# Patient Record
Sex: MALE | Race: WHITE | NOT HISPANIC OR LATINO | Employment: UNEMPLOYED | URBAN - METROPOLITAN AREA
[De-identification: names, ages, dates, MRNs, and addresses within clinical notes are randomized per-mention and may not be internally consistent; named-entity substitution may affect disease eponyms.]

---

## 2022-10-05 PROBLEM — T14.8XXA MUSCLE STRAIN: Status: ACTIVE | Noted: 2022-10-05

## 2022-10-05 PROBLEM — I21.4 NSTEMI (NON-ST ELEVATED MYOCARDIAL INFARCTION): Status: ACTIVE | Noted: 2022-10-05

## 2022-10-05 PROBLEM — R45.89 DEPRESSED MOOD: Status: ACTIVE | Noted: 2022-10-05

## 2022-10-05 PROBLEM — Z72.0 TOBACCO ABUSE: Status: ACTIVE | Noted: 2022-10-05

## 2022-12-05 ENCOUNTER — CLINICAL SUPPORT (OUTPATIENT)
Dept: SMOKING CESSATION | Facility: CLINIC | Age: 47
End: 2022-12-05
Payer: COMMERCIAL

## 2022-12-05 DIAGNOSIS — F17.200 NICOTINE DEPENDENCE: Primary | ICD-10-CM

## 2022-12-05 PROCEDURE — 99404 PR PREVENT COUNSEL,INDIV,60 MIN: ICD-10-PCS | Mod: S$GLB,,, | Performed by: GENERAL PRACTICE

## 2022-12-05 PROCEDURE — 99404 PREV MED CNSL INDIV APPRX 60: CPT | Mod: S$GLB,,, | Performed by: GENERAL PRACTICE

## 2022-12-05 NOTE — Clinical Note
Met with patient in clinic to conduct intake appointment. Patient will be participating in weekly tobacco cessation meetings and will begin the prescribed tobacco cessation medication Chantix (Varenicline) 1mg twice a day. FTND of 4 indicates a low level of dependence to tobacco. DARYL-D of 11 is perceived as no mental distress/ depression.

## 2022-12-05 NOTE — PROGRESS NOTES
Met with patient in clinic to conduct intake appointment. Patient will be participating in weekly tobacco cessation meetings and will begin the prescribed tobacco cessation medication Chantix (Varenicline) 1mg twice a day. FTND of 4 indicates a low level of dependence to tobacco. DARYL-D of 11 is perceived as no mental distress/ depression.     Created addendum to order Chantix medication

## 2022-12-07 RX ORDER — VARENICLINE TARTRATE 1 MG/1
1 TABLET, FILM COATED ORAL 2 TIMES DAILY
Qty: 56 TABLET | Refills: 0 | Status: SHIPPED | OUTPATIENT
Start: 2022-12-07 | End: 2023-01-04

## 2022-12-14 ENCOUNTER — CLINICAL SUPPORT (OUTPATIENT)
Dept: SMOKING CESSATION | Facility: CLINIC | Age: 47
End: 2022-12-14
Payer: MEDICAID

## 2022-12-14 DIAGNOSIS — F17.200 NICOTINE DEPENDENCE: Primary | ICD-10-CM

## 2022-12-14 PROCEDURE — 99402 PR PREVENT COUNSEL,INDIV,30 MIN: ICD-10-PCS | Mod: S$GLB,,, | Performed by: GENERAL PRACTICE

## 2022-12-14 PROCEDURE — 99402 PREV MED CNSL INDIV APPRX 30: CPT | Mod: S$GLB,,, | Performed by: GENERAL PRACTICE

## 2022-12-14 NOTE — Clinical Note
Spoke with patient at length via phone regarding tobacco cessation progress. The patient remains on the prescribed tobacco cessation medication regimen of Chantix 1mg twice daily without any negative side effects at this time. Patient is on day 5 of medication. He says that he has decreased from 20 to 12 cigarettes a day. I commended him on cutting down. He stated that he feels a lot better. Patient wanted to quit smoking because he had a heart attack 3 months ago and he currently has high blood pressure. Triggers are boredom, after meal and Dr. Pepper. Goal next 2 weeks is to delay after meal, decrease caffeine intake and add water. Discussed learned addiction model, cues/triggers, personal reasons for quitting, medications, and goals. The patient will continue with therapy sessions and medication monitoring by CTTS. Prescribed medication management will be by physician. The patient denies any abnormal behavioral or mental changes at this time.

## 2022-12-14 NOTE — PROGRESS NOTES
Individual Follow-Up Form    12/14/2022    Quit Date: none    Clinical Status of Patient: Outpatient    Length of Service: 30 minutes    Continuing Medication: yes  Chantix    Other Medications: none     Target Symptoms: Withdrawal and medication side effects. The following were rated moderate (3) to severe (4) on TCRS:  Moderate (3): none  Severe (4): none    Comments: Spoke with patient at length via phone regarding tobacco cessation progress. The patient remains on the prescribed tobacco cessation medication regimen of Chantix 1mg twice daily without any negative side effects at this time. Patient is on day 5 of medication. He says that he has decreased from 20 to 12 cigarettes a day. I commended him on cutting down. He stated that he feels a lot better. Patient wanted to quit smoking because he had a heart attack 3 months ago and he currently has high blood pressure. Triggers are boredom, after meal and Dr. Pepper. Goal next 2 weeks is to delay after meal, decrease caffeine intake and add water. Discussed learned addiction model, cues/triggers, personal reasons for quitting, medications, and goals. The patient will continue with therapy sessions and medication monitoring by CTTS. Prescribed medication management will be by physician. The patient denies any abnormal behavioral or mental changes at this time.           Diagnosis: F17.200    Next Visit: 2 weeks

## 2023-01-04 ENCOUNTER — CLINICAL SUPPORT (OUTPATIENT)
Dept: SMOKING CESSATION | Facility: CLINIC | Age: 48
End: 2023-01-04
Payer: COMMERCIAL

## 2023-01-04 DIAGNOSIS — F17.200 NICOTINE DEPENDENCE: Primary | ICD-10-CM

## 2023-01-04 PROCEDURE — 99402 PR PREVENT COUNSEL,INDIV,30 MIN: ICD-10-PCS | Mod: S$GLB,,, | Performed by: GENERAL PRACTICE

## 2023-01-04 PROCEDURE — 99402 PREV MED CNSL INDIV APPRX 30: CPT | Mod: S$GLB,,, | Performed by: GENERAL PRACTICE

## 2023-01-04 RX ORDER — VARENICLINE TARTRATE 1 MG/1
1 TABLET, FILM COATED ORAL 2 TIMES DAILY
Qty: 56 TABLET | Refills: 0 | Status: SHIPPED | OUTPATIENT
Start: 2023-01-04 | End: 2023-01-09

## 2023-01-04 NOTE — PROGRESS NOTES
Individual Follow-Up Form    1/4/2023    Quit Date: TBD    Clinical Status of Patient: Outpatient    Length of Service: 30 minutes    Continuing Medication: yes  Chantix    Other Medications: none     Target Symptoms: Withdrawal and medication side effects. The following were  rated moderate (3) to severe (4) on TCRS:  Moderate (3): none  Severe (4): none    Comments: Spoke with patient at length via phone regarding tobacco cessation progress. The patient remains on the prescribed tobacco cessation medication regimen of Chantix 1mg twice daily without any negative side effects at this time. Refilled Chantix. He has decreased from 12 to 5 cigarettes a day. I commended him on cutting down. Patient met goal of making home tobacco free and switched from Dr. Pepper to Gloria. Trigger remain after meal. Goal next two weeks is to wait at least 10 minutes after meal before smoking. Patient stated no withdrawal symptoms. Reviewed strategies, cues, triggers and new goal set. The patient will continue with  therapy sessions and medication monitoring by CTTS. Prescribed medication management will be by physician. The patient denies any abnormal behavioral or mental changes at this time.         Diagnosis: F17.200    Next Visit: 2 weeks

## 2023-01-04 NOTE — Clinical Note
Spoke with patient at length via phone regarding tobacco cessation progress. The patient remains on the prescribed tobacco cessation medication regimen of Chantix 1mg twice daily without any negative side effects at this time. Refilled Chantix. He has decreased from 12 to 5 cigarettes a day. I commended him on cutting down. Patient met goal of making home tobacco free and switched from Dr. Pepper to Gloria. Trigger remain after meal. Goal next two weeks is to wait at least 10 minutes after meal before smoking. Patient stated no withdrawal symptoms. Reviewed strategies, cues, triggers and new goal set. The patient will continue with  therapy sessions and medication monitoring by CTTS. Prescribed medication management will be by physician. The patient denies any abnormal behavioral or mental changes at this time.

## 2023-01-09 DIAGNOSIS — F17.200 NICOTINE DEPENDENCE: Primary | ICD-10-CM

## 2023-01-09 PROBLEM — I21.4 NSTEMI (NON-ST ELEVATED MYOCARDIAL INFARCTION): Status: RESOLVED | Noted: 2022-10-05 | Resolved: 2023-01-09

## 2023-01-09 RX ORDER — VARENICLINE TARTRATE 1 MG/1
1 TABLET, FILM COATED ORAL 2 TIMES DAILY
Qty: 56 TABLET | Refills: 0 | Status: SHIPPED | OUTPATIENT
Start: 2023-01-09 | End: 2023-02-06 | Stop reason: SDUPTHER

## 2023-01-09 NOTE — TELEPHONE ENCOUNTER
Patient called and stated that the [facility] did not have the Chantix medication available. Sending refill request to [facility].

## 2023-01-18 ENCOUNTER — CLINICAL SUPPORT (OUTPATIENT)
Dept: SMOKING CESSATION | Facility: CLINIC | Age: 48
End: 2023-01-18
Payer: COMMERCIAL

## 2023-01-18 DIAGNOSIS — F17.200 NICOTINE DEPENDENCE: Primary | ICD-10-CM

## 2023-01-18 PROCEDURE — 99404 PREV MED CNSL INDIV APPRX 60: CPT | Mod: S$GLB,,, | Performed by: GENERAL PRACTICE

## 2023-01-18 PROCEDURE — 99404 PR PREVENT COUNSEL,INDIV,60 MIN: ICD-10-PCS | Mod: S$GLB,,, | Performed by: GENERAL PRACTICE

## 2023-01-18 RX ORDER — BUPROPION HYDROCHLORIDE 150 MG/1
TABLET, EXTENDED RELEASE ORAL
Qty: 60 TABLET | Refills: 0 | Status: SHIPPED | OUTPATIENT
Start: 2023-01-18 | End: 2023-02-22

## 2023-01-18 NOTE — Clinical Note
Patient remains on the prescribed tobacco cessation medication regimen of Chantix 1mg twice daily without any negative side effects at this time. No refill on Chantix. Ordered Wellbutrin SR 150mg. He has increased from 5 to 20 cigarettes a day due to increased anxiety and stress. Patient did not meet goal of making home completely tobacco free. He goes outside when its convenient. He smoke mainly in his bedroom. Goal is to make home tobacco free and keep cigarettes in truck and find hobby to keep hands busy. Reviewed strategies, controlling environment, cues, triggers, new goals set. Introduced high risk situations with preparation interventions, caffeine similarities with withdrawal issues of habit and nicotine, alcohol, understanding urges, cravings, stress and relaxation. The patient will continue with  therapy sessions and medication monitoring by CTTS. Prescribed medication management will be by physician. The patient denies any abnormal behavioral or mental changes at this time.

## 2023-01-18 NOTE — PROGRESS NOTES
Individual Follow-Up Form    1/18/2023    Quit Date: TBD    Clinical Status of Patient: Outpatient    Length of Service: 60 minutes    Continuing Medication: yes  Chantix    Other Medications: none     Target Symptoms: Withdrawal and medication side effects. The following were rated moderate (3) to severe (4) on TCRS:  Moderate (3): none  Severe (4): none    Comments: Met with patient in clinic regarding tobacco cessation progress.  Patient remains on the prescribed tobacco cessation medication regimen of Chantix 1mg twice daily without any negative side effects at this time. No refill on Chantix. Ordered Wellbutrin SR 150mg. He has increased from 5 to 20 cigarettes a day due to increased anxiety and stress. Patient did not meet goal of making home completely tobacco free. He goes outside when its convenient. He smoke mainly in his bedroom. He continues to drink 3 cans of Pepper per day. Goal is to make home tobacco free and keep cigarettes in truck and find hobby to keep hands busy. Reviewed strategies, controlling environment, cues, triggers, new goals set. Introduced high risk situations with preparation interventions, caffeine similarities with withdrawal issues of habit and nicotine, alcohol, understanding urges, cravings, stress and relaxation. The patient will continue with  therapy sessions and medication monitoring by CTTS. Prescribed medication management will be by physician. The patient denies any abnormal behavioral or mental changes at this time.           Diagnosis: F17.200    Next Visit: 2 weeks

## 2023-02-05 PROBLEM — Z72.0 TOBACCO ABUSE: Status: RESOLVED | Noted: 2022-10-05 | Resolved: 2023-02-05

## 2023-02-06 ENCOUNTER — CLINICAL SUPPORT (OUTPATIENT)
Dept: SMOKING CESSATION | Facility: CLINIC | Age: 48
End: 2023-02-06
Payer: COMMERCIAL

## 2023-02-06 DIAGNOSIS — F17.200 NICOTINE DEPENDENCE: ICD-10-CM

## 2023-02-06 DIAGNOSIS — F17.200 NICOTINE DEPENDENCE: Primary | ICD-10-CM

## 2023-02-06 PROCEDURE — 99404 PR PREVENT COUNSEL,INDIV,60 MIN: ICD-10-PCS | Mod: S$GLB,,, | Performed by: GENERAL PRACTICE

## 2023-02-06 PROCEDURE — 99404 PREV MED CNSL INDIV APPRX 60: CPT | Mod: S$GLB,,, | Performed by: GENERAL PRACTICE

## 2023-02-06 RX ORDER — VARENICLINE TARTRATE 1 MG/1
1 TABLET, FILM COATED ORAL 2 TIMES DAILY
Qty: 56 TABLET | Refills: 0 | Status: SHIPPED | OUTPATIENT
Start: 2023-02-06 | End: 2023-05-15 | Stop reason: SDUPTHER

## 2023-02-06 NOTE — Clinical Note
Met with patient in clinic regarding tobacco cessation progress.  Patient remains on the prescribed tobacco cessation medication regimen of Chantix 1mg twice daily without any negative side effects at this time. Refilled Chantix. He has decreased down to 5 cigarettes a day. I commended him on cutting down again. Patient meet goal of making home tobacco free. He continues to drink 3 cans of Dr.Pepper per day. Goal  find hobby to keep hands busy. Reviewed strategies, controlling environment, cues, triggers, new goals set.  The patient will continue with  therapy sessions and medication monitoring by CTTS. Prescribed medication management will be by physician. The patient denies any abnormal behavioral or mental changes at this time.

## 2023-02-06 NOTE — PROGRESS NOTES
Individual Follow-Up Form    2/6/2023    Quit Date: TBD    Clinical Status of Patient: Outpatient    Length of Service: 60 minutes    Continuing Medication: yes  Chantix or Wellbutrin    Other Medications: none     Target Symptoms: Withdrawal and medication side effects. The following were rated moderate (3) to severe (4) on TCRS:  Moderate (3): none  Severe (4): none    Comments:  Met with patient in clinic regarding tobacco cessation progress.  Patient remains on the prescribed tobacco cessation medication regimen of Chantix 1mg twice daily without any negative side effects at this time. Refilled Chantix. He has decreased down to 5 cigarettes a day. I commended him on cutting down again. Patient meet goal of making home tobacco free. He continues to drink 3 cans of Dr.Pepper per day. Goal  find hobby to keep hands busy. Reviewed strategies, controlling environment, cues, triggers, new goals set.  The patient will continue with  therapy sessions and medication monitoring by CTTS. Prescribed medication management will be by physician. The patient denies any abnormal behavioral or mental changes at this time.     Diagnosis: F17.200    Next Visit: 2 weeks

## 2023-02-22 ENCOUNTER — CLINICAL SUPPORT (OUTPATIENT)
Dept: SMOKING CESSATION | Facility: CLINIC | Age: 48
End: 2023-02-22
Payer: COMMERCIAL

## 2023-02-22 DIAGNOSIS — F17.200 NICOTINE DEPENDENCE: ICD-10-CM

## 2023-02-22 PROCEDURE — 99403 PR PREVENT COUNSEL,INDIV,45 MIN: ICD-10-PCS | Mod: S$GLB,,, | Performed by: GENERAL PRACTICE

## 2023-02-22 PROCEDURE — 99403 PREV MED CNSL INDIV APPRX 45: CPT | Mod: S$GLB,,, | Performed by: GENERAL PRACTICE

## 2023-02-22 RX ORDER — BUPROPION HYDROCHLORIDE 150 MG/1
150 TABLET, EXTENDED RELEASE ORAL 2 TIMES DAILY
Qty: 60 TABLET | Refills: 0 | Status: SHIPPED | OUTPATIENT
Start: 2023-02-22 | End: 2023-04-13 | Stop reason: SDUPTHER

## 2023-02-22 NOTE — PROGRESS NOTES
Individual Follow-Up Form    2/22/2023    Quit Date: TBD    Clinical Status of Patient: Outpatient    Length of Service: 45 minutes    Continuing Medication: yes  Chantix or Wellbutrin    Other Medications: none     Target Symptoms: Withdrawal and medication side effects. The following were rated moderate (3) to severe (4) on TCRS:  Moderate (3): none  Severe (4): none    Comments: Met with patient in clinic regarding tobacco cessation progress.  Patient remains on the prescribed tobacco cessation medication regimen of Chantix 1mg twice daily without any negative side effects at this time. Refilled Wellbutrin. Patient says on a good day he smokes 4 but on bad he smokes up to 10. He has had two bad days in a row. Goal is to limit to 5 cigarettes a day, find hobby to keep hands busy and de stress. He currently workout at gym a couple times a week. He is willing to increase workout to help with stress as well as get a coloring book. Reviewed strategies, controlling environment, cues, triggers, new goals set.  The patient will continue with  therapy sessions and medication monitoring by CTTS. Prescribed medication management will be by physician. The patient denies any abnormal behavioral or mental changes at this time.    Diagnosis: F17.200    Next Visit: 2 weeks

## 2023-02-22 NOTE — Clinical Note
Met with patient in clinic regarding tobacco cessation progress.  Patient remains on the prescribed tobacco cessation medication regimen of Chantix 1mg twice daily without any negative side effects at this time. Refilled Wellbutrin. Patient says on a good day he smokes 4 but on bad he smokes up to 10. He has had two bad days in a row. Goal is to limit to 5 cigarettes a day, find hobby to keep hands busy and de stress. He currently workout at gym a couple times a week. He is willing to increase workout to help with stress as well as get a coloring book. Reviewed strategies, controlling environment, cues, triggers, new goals set.  The patient will continue with  therapy sessions and medication monitoring by CTTS. Prescribed medication management will be by physician. The patient denies any abnormal behavioral or mental changes at this time.

## 2023-03-06 ENCOUNTER — CLINICAL SUPPORT (OUTPATIENT)
Dept: SMOKING CESSATION | Facility: CLINIC | Age: 48
End: 2023-03-06

## 2023-03-06 DIAGNOSIS — F17.200 NICOTINE DEPENDENCE: Primary | ICD-10-CM

## 2023-03-06 PROCEDURE — 99407 BEHAV CHNG SMOKING > 10 MIN: CPT | Mod: S$GLB,,,

## 2023-03-06 PROCEDURE — 99999 PR PBB SHADOW E&M-EST. PATIENT-LVL I: CPT | Mod: PBBFAC,,,

## 2023-03-06 PROCEDURE — 99999 PR PBB SHADOW E&M-EST. PATIENT-LVL I: ICD-10-PCS | Mod: PBBFAC,,,

## 2023-03-06 PROCEDURE — 99407 PR TOBACCO USE CESSATION INTENSIVE >10 MINUTES: ICD-10-PCS | Mod: S$GLB,,,

## 2023-03-06 NOTE — PROGRESS NOTES
Spoke with patient today in regard to smoking cessation progress for 3 month telephone follow up, he states not tobacco free. Patient is currently in the program with a scheduled follow up visit and using Chantix to help aid in his quit attempt. Informed patient of benefit period, future follow ups, and contact information if any further help or support is needed. Will complete smart form for 3 month follow up on Quit attempt #1.

## 2023-03-08 ENCOUNTER — CLINICAL SUPPORT (OUTPATIENT)
Dept: SMOKING CESSATION | Facility: CLINIC | Age: 48
End: 2023-03-08
Payer: MEDICAID

## 2023-03-08 DIAGNOSIS — F17.200 NICOTINE DEPENDENCE: Primary | ICD-10-CM

## 2023-03-08 PROCEDURE — 99402 PREV MED CNSL INDIV APPRX 30: CPT | Mod: S$GLB,,, | Performed by: GENERAL PRACTICE

## 2023-03-08 PROCEDURE — 99402 PR PREVENT COUNSEL,INDIV,30 MIN: ICD-10-PCS | Mod: S$GLB,,, | Performed by: GENERAL PRACTICE

## 2023-03-08 NOTE — Clinical Note
Spoke with patient at length via phone regarding tobacco cessation progress.  Patient remains on the prescribed tobacco cessation medication regimen of Chantix 1mg twice daily without any negative side effects at this time. No refills needed. Patient says he has met hi goal of decreasing to 5 cigarettes a day. He also has a hobby of making toy cars to keep himself busy. He continues to workout at gym a couple times a week. Reviewed strategies, controlling environment, cues, triggers, new goals set.  The patient will continue with therapy sessions and medication monitoring by CTTS. Prescribed medication management will be by physician. The patient denies any abnormal behavioral or mental changes at this time.

## 2023-03-08 NOTE — PROGRESS NOTES
Individual Follow-Up Form    3/8/2023    Quit Date: tenative 2/8/23    Clinical Status of Patient: Outpatient    Length of Service: 30 minutes    Continuing Medication: yes  Chantix or Wellbutrin    Other Medications: none     Target Symptoms: Withdrawal and medication side effects. The following were rated moderate (3) to severe (4) on TCRS:  Moderate (3): none  Severe (4): none    Comments: Spoke with patient at length via phone regarding tobacco cessation progress.  Patient remains on the prescribed tobacco cessation medication regimen of Chantix 1mg twice daily without any negative side effects at this time. No refills needed. Patient says he has met hi goal of decreasing to 5 cigarettes a day. He also has a hobby of making toy cars to keep himself busy. He continues to workout at gym a couple times a week. Reviewed strategies, controlling environment, cues, triggers, new goals set.  The patient will continue with therapy sessions and medication monitoring by CTTS. Prescribed medication management will be by physician. The patient denies any abnormal behavioral or mental changes at this time.    Diagnosis: F17.200    Next Visit: 2 weeks

## 2023-03-29 ENCOUNTER — CLINICAL SUPPORT (OUTPATIENT)
Dept: SMOKING CESSATION | Facility: CLINIC | Age: 48
End: 2023-03-29
Payer: MEDICAID

## 2023-03-29 DIAGNOSIS — F17.200 NICOTINE DEPENDENCE: Primary | ICD-10-CM

## 2023-03-29 PROCEDURE — 99404 PREV MED CNSL INDIV APPRX 60: CPT | Mod: S$GLB,,, | Performed by: GENERAL PRACTICE

## 2023-03-29 PROCEDURE — 99404 PR PREVENT COUNSEL,INDIV,60 MIN: ICD-10-PCS | Mod: S$GLB,,, | Performed by: GENERAL PRACTICE

## 2023-03-29 NOTE — PROGRESS NOTES
Individual Follow-Up Form    3/29/2023    Quit Date: TBD    Clinical Status of Patient: Outpatient    Length of Service: 60 minutes    Continuing Medication: yes  Chantix or Wellbutrin    Other Medications: none     Target Symptoms: Withdrawal and medication side effects. The following were rated moderate (3) to severe (4) on TCRS:  Moderate (3): dreams  Severe (4): none    Comments: Spoke with patient at length via phone regarding tobacco cessation progress. Patient remains on the prescribed tobacco cessation medication regimen of Chantix 1mg twice daily and Wellbutrin SR 150mg without any negative side effects at this time. No refills needed. Patient says he has increased from 5 to 20 cigarettes a day due to stressors. We discussed strategies for stressors and ways to keep himself busy- exercising, and gun range with new weapons purchased. Reviewed strategies, controlling environment, cues, triggers, new goals set. The patient will continue with therapy sessions and medication monitoring by CTTS. Prescribed medication management will be by physician. The patient denies any abnormal behavioral or mental changes at this time.    Diagnosis: F17.200    Next Visit: 2 weeks

## 2023-03-29 NOTE — Clinical Note
Spoke with patient at length via phone regarding tobacco cessation progress. Patient remains on the prescribed tobacco cessation medication regimen of Chantix 1mg twice daily and Wellbutrin SR 150mg without any negative side effects at this time. No refills needed. Patient says he has increased from 5 to 20 cigarettes a day due to stressors. We discussed strategies for stressors and ways to keep himself busy- exercising, and gun range with new weapons purchased. Reviewed strategies, controlling environment, cues, triggers, new goals set. The patient will continue with therapy sessions and medication monitoring by CTTS. Prescribed medication management will be by physician. The patient denies any abnormal behavioral or mental changes at this time.

## 2023-04-13 ENCOUNTER — CLINICAL SUPPORT (OUTPATIENT)
Dept: SMOKING CESSATION | Facility: CLINIC | Age: 48
End: 2023-04-13
Payer: COMMERCIAL

## 2023-04-13 DIAGNOSIS — F17.200 NICOTINE DEPENDENCE: Primary | ICD-10-CM

## 2023-04-13 PROCEDURE — 99402 PREV MED CNSL INDIV APPRX 30: CPT | Mod: S$GLB,,, | Performed by: GENERAL PRACTICE

## 2023-04-13 PROCEDURE — 99402 PR PREVENT COUNSEL,INDIV,30 MIN: ICD-10-PCS | Mod: S$GLB,,, | Performed by: GENERAL PRACTICE

## 2023-04-13 RX ORDER — DM/P-EPHED/ACETAMINOPH/DOXYLAM 30-7.5/3
2 LIQUID (ML) ORAL
Qty: 72 LOZENGE | Refills: 0 | Status: SHIPPED | OUTPATIENT
Start: 2023-04-13 | End: 2024-02-05 | Stop reason: SDUPTHER

## 2023-04-13 RX ORDER — BUPROPION HYDROCHLORIDE 150 MG/1
150 TABLET, EXTENDED RELEASE ORAL 2 TIMES DAILY
Qty: 60 TABLET | Refills: 0 | Status: SHIPPED | OUTPATIENT
Start: 2023-04-13 | End: 2023-05-15 | Stop reason: SDUPTHER

## 2023-04-13 NOTE — Clinical Note
Met with patient in clinic regarding tobacco cessation progress.  Patient remains on the prescribed tobacco cessation medication regimen of Chantix 1mg twice a day and Wellbutrin SR 150mg twice daily without any negative side effects at this time. Refilled Wellbutrin and ordered 2mg lozenges. Patient quit again on 4/11/23. I commended him on quitting again. Patient says he has strong craving a few time a day. I encouraged him to drink plenty of water and use lozenges as needed. Reviewed strategies, controlling environment, cues, triggers and ways to not slip and relapse. The goal is surpass the 8 days of quit last time. The patient will continue with therapy sessions and medication monitoring by CTTS. Prescribed medication management will be by physician. The patient denies any abnormal behavioral or mental changes at this time.

## 2023-04-13 NOTE — PROGRESS NOTES
Individual Follow-Up Form    4/13/2023    Quit Date: 4/11/23    Clinical Status of Patient: Outpatient    Length of Service: 30 minutes    Continuing Medication: yes  Chantix or Wellbutrin    Other Medications: 2mg lozenges ordered     Target Symptoms: Withdrawal and medication side effects. The following were  rated moderate (3) to severe (4) on TCRS:  Moderate (3): none  Severe (4): none    Comments: Met with patient in clinic regarding tobacco cessation progress.  Patient remains on the prescribed tobacco cessation medication regimen of Chantix 1mg twice a day and Wellbutrin SR 150mg twice daily without any negative side effects at this time. Refilled Wellbutrin and ordered 2mg lozenges. Patient quit again on 4/11/23. I commended him on quitting again. Patient says he has strong craving a few time a day. I encouraged him to drink plenty of water and use lozenges as needed. Reviewed strategies, controlling environment, cues, triggers and ways to not slip and relapse. The goal is surpass the 8 days of quit last time. The patient will continue with therapy sessions and medication monitoring by CTTS. Prescribed medication management will be by physician. The patient denies any abnormal behavioral or mental changes at this time.    Diagnosis: F17.200    Next Visit: 2 weeks

## 2023-05-01 ENCOUNTER — CLINICAL SUPPORT (OUTPATIENT)
Dept: SMOKING CESSATION | Facility: CLINIC | Age: 48
End: 2023-05-01
Payer: COMMERCIAL

## 2023-05-01 DIAGNOSIS — F17.200 NICOTINE DEPENDENCE: Primary | ICD-10-CM

## 2023-05-01 PROCEDURE — 99402 PREV MED CNSL INDIV APPRX 30: CPT | Mod: S$GLB,,, | Performed by: GENERAL PRACTICE

## 2023-05-01 PROCEDURE — 99402 PR PREVENT COUNSEL,INDIV,30 MIN: ICD-10-PCS | Mod: S$GLB,,, | Performed by: GENERAL PRACTICE

## 2023-05-01 NOTE — Clinical Note
Spoke with patient at length via phone regarding tobacco cessation progress. Patient remains on the prescribed tobacco cessation medication regimen of Chantix 1mg twice daily, Wellbutrin SR 150mg and 2mg lozenges (3-4) without any negative side effects at this time. No refills needed. Patient remains quit as of 4/11/23. I commended him on staying quit. Patient craves first thing in the mornings and after meals so that's when he uses  We discussed strategies for stressors. He is on a mediterranean diet to eat healthier and exercing. Reviewed strategies, controlling environment, cues, triggers, new goals set. The patient will continue with therapy sessions and medication monitoring by CTTS. Prescribed medication management will be by physician. The patient denies any abnormal behavioral or mental changes at this time.

## 2023-05-01 NOTE — PROGRESS NOTES
Individual Follow-Up Form    5/1/2023    Quit Date: 4/11/23    Clinical Status of Patient: Outpatient    Length of Service: 30 minutes    Continuing Medication: yes  Chantix, Wellbutrin, or Nicotine Lozenges    Other Medications: none     Target Symptoms: Withdrawal and medication side effects. The following were rated moderate (3) to severe (4) on TCRS:  Moderate (3): none  Severe (4): none    Comments: Spoke with patient at length via phone regarding tobacco cessation progress. Patient remains on the prescribed tobacco cessation medication regimen of Chantix 1mg twice daily, Wellbutrin SR 150mg and 2mg lozenges (3-4) without any negative side effects at this time. No refills needed. Patient remains quit as of 4/11/23. I commended him on staying quit. Patient craves first thing in the mornings and after meals so that's when he uses  We discussed strategies for stressors. He is on a mediterranean diet to eat healthier and exercing. Reviewed strategies, controlling environment, cues, triggers, new goals set. The patient will continue with therapy sessions and medication monitoring by CTTS. Prescribed medication management will be by physician. The patient denies any abnormal behavioral or mental changes at this time.     Diagnosis: F17.200    Next Visit: 2 weeks

## 2023-05-15 ENCOUNTER — CLINICAL SUPPORT (OUTPATIENT)
Dept: SMOKING CESSATION | Facility: CLINIC | Age: 48
End: 2023-05-15
Payer: MEDICAID

## 2023-05-15 DIAGNOSIS — F17.200 NICOTINE DEPENDENCE: Primary | ICD-10-CM

## 2023-05-15 PROCEDURE — 99402 PR PREVENT COUNSEL,INDIV,30 MIN: ICD-10-PCS | Mod: S$GLB,,, | Performed by: GENERAL PRACTICE

## 2023-05-15 PROCEDURE — 99402 PREV MED CNSL INDIV APPRX 30: CPT | Mod: S$GLB,,, | Performed by: GENERAL PRACTICE

## 2023-05-15 RX ORDER — VARENICLINE TARTRATE 1 MG/1
1 TABLET, FILM COATED ORAL 2 TIMES DAILY
Qty: 56 TABLET | Refills: 0 | Status: SHIPPED | OUTPATIENT
Start: 2023-05-15 | End: 2023-08-04

## 2023-05-15 RX ORDER — BUPROPION HYDROCHLORIDE 150 MG/1
150 TABLET, EXTENDED RELEASE ORAL 2 TIMES DAILY
Qty: 60 TABLET | Refills: 0 | Status: SHIPPED | OUTPATIENT
Start: 2023-05-15 | End: 2023-07-10 | Stop reason: SDUPTHER

## 2023-05-15 NOTE — Clinical Note
Spoke with patient at length via phone regarding tobacco cessation progress. Patient remains on the prescribed tobacco cessation medication regimen of Chantix 1mg twice daily, Wellbutrin SR 150mg and 2mg lozenges (3-4) without any negative side effects at this time. Refilled Chantix and Wellbutrin. Patient remains quit as of 4/11/23. I commended him on staying quit. Patient craves first thing in the mornings and after meals so that's when he uses the lozenges. We discussed strategies for stressors. He continues on the mediterranean diet to eat healthier and exercising as well. Reviewed strategies, cues, triggers, high risk situations, lapses, relapses, diet, exercise, stress, relaxation, sleep, habitual behavior, and life style changes. The patient will continue with therapy sessions and medication monitoring by CTTS. Prescribed medication management will be by physician. The patient denies any abnormal behavioral or mental changes at this time.

## 2023-05-15 NOTE — PROGRESS NOTES
Individual Follow-Up Form    5/15/2023    Quit Date: 4/11/23    Clinical Status of Patient: Outpatient    Length of Service: 30 minutes    Continuing Medication: yes  Chantix, Wellbutrin, or Nicotine Lozenges    Other Medications: none     Target Symptoms: Withdrawal and medication side effects. The following were rated moderate (3) to severe (4) on TCRS:  Moderate (3): none  Severe (4): none    Comments: Spoke with patient at length via phone regarding tobacco cessation progress. Patient remains on the prescribed tobacco cessation medication regimen of Chantix 1mg twice daily, Wellbutrin SR 150mg and 2mg lozenges (3-4) without any negative side effects at this time. Refilled Chantix and Wellbutrin. Patient remains quit as of 4/11/23. I commended him on staying quit. Patient craves first thing in the mornings and after meals so that's when he uses the lozenges. We discussed strategies for stressors. He continues on the mediterranean diet to eat healthier and exercising as well. Reviewed strategies, cues, triggers, high risk situations, lapses, relapses, diet, exercise, stress, relaxation, sleep, habitual behavior, and life style changes. The patient will continue with therapy sessions and medication monitoring by CTTS. Prescribed medication management will be by physician. The patient denies any abnormal behavioral or mental changes at this time.     Diagnosis: F17.200    Next Visit: 2 weeks

## 2023-06-01 ENCOUNTER — CLINICAL SUPPORT (OUTPATIENT)
Dept: SMOKING CESSATION | Facility: CLINIC | Age: 48
End: 2023-06-01

## 2023-06-01 DIAGNOSIS — F17.200 NICOTINE DEPENDENCE: Primary | ICD-10-CM

## 2023-06-01 PROCEDURE — 99406 BEHAV CHNG SMOKING 3-10 MIN: CPT | Mod: S$GLB,,,

## 2023-06-01 PROCEDURE — 99999 PR PBB SHADOW E&M-EST. PATIENT-LVL I: CPT | Mod: PBBFAC,,,

## 2023-06-01 PROCEDURE — 99999 PR PBB SHADOW E&M-EST. PATIENT-LVL I: ICD-10-PCS | Mod: PBBFAC,,,

## 2023-06-01 PROCEDURE — 99406 PR TOBACCO USE CESSATION INTERMEDIATE 3-10 MINUTES: ICD-10-PCS | Mod: S$GLB,,,

## 2023-06-01 NOTE — PROGRESS NOTES
Spoke with patient today in regard to smoking cessation progress for 6-month telephone follow up.  Patient states that he is tobacco free. Commended patient on their quit.  Patient currently in program and scheduled with his CTTS.  Patient states he is pleased with the help and support he is receiving from CTTS, Angelina Walden.  Informed patient of benefit period, future follow up, and contact information if any further help or support is needed.  Will complete smart form for 6 month follow up on Quit attempt #1.      
English

## 2023-06-12 ENCOUNTER — TELEPHONE (OUTPATIENT)
Dept: SMOKING CESSATION | Facility: CLINIC | Age: 48
End: 2023-06-12
Payer: MEDICAID

## 2023-06-12 NOTE — TELEPHONE ENCOUNTER
Patient has not arrived for clinic follow up. Attempted to follow up with patient regarding tobacco cessation progress via phone. Patient did not answer. The phone continued to ring.

## 2023-07-08 DIAGNOSIS — F17.200 NICOTINE DEPENDENCE: ICD-10-CM

## 2023-07-10 ENCOUNTER — CLINICAL SUPPORT (OUTPATIENT)
Dept: SMOKING CESSATION | Facility: CLINIC | Age: 48
End: 2023-07-10

## 2023-07-10 DIAGNOSIS — F17.200 NICOTINE DEPENDENCE: Primary | ICD-10-CM

## 2023-07-10 PROCEDURE — 99403 PR PREVENT COUNSEL,INDIV,45 MIN: ICD-10-PCS | Mod: S$GLB,,, | Performed by: DIETITIAN, REGISTERED

## 2023-07-10 PROCEDURE — 99403 PREV MED CNSL INDIV APPRX 45: CPT | Mod: S$GLB,,, | Performed by: DIETITIAN, REGISTERED

## 2023-07-10 RX ORDER — VARENICLINE TARTRATE 1 MG/1
1 TABLET, FILM COATED ORAL 2 TIMES DAILY
Qty: 56 TABLET | Refills: 0 | OUTPATIENT
Start: 2023-07-10

## 2023-07-10 RX ORDER — BUPROPION HYDROCHLORIDE 150 MG/1
150 TABLET, EXTENDED RELEASE ORAL 2 TIMES DAILY
Qty: 60 TABLET | Refills: 0 | Status: SHIPPED | OUTPATIENT
Start: 2023-07-10 | End: 2024-02-05 | Stop reason: SDUPTHER

## 2023-07-10 RX ORDER — BUPROPION HYDROCHLORIDE 150 MG/1
150 TABLET, EXTENDED RELEASE ORAL 2 TIMES DAILY
Qty: 60 TABLET | Refills: 0 | OUTPATIENT
Start: 2023-07-10 | End: 2023-08-09

## 2023-07-10 NOTE — PROGRESS NOTES
"  Telephonic visit .  Patient called CTTS Angelina Castro for medication refills. Pt shares although he had quit back in April, he relapsed "about two weeks ago, after running out of medication". He was on Varenicline 1 mg BID, and Bupropion 150mg BID. He shares he bought a pack of cigarettes and had been smoking about 5 cigarettes per day. He newly quit "2 days ago", but feels he needs medications to maintain his quit. Educated pt on the uses of tobacco cessation medications and nicotine dependence/withdrawals. Pt verbalized understanding. Pt agreed to a trial period of no Chantix, only wellbutrin and lozenges to see if he is able to maintain his new quit. Contact info given to patient and understands he can call CTTS at any time.    "

## 2023-12-07 ENCOUNTER — CLINICAL SUPPORT (OUTPATIENT)
Dept: SMOKING CESSATION | Facility: CLINIC | Age: 48
End: 2023-12-07

## 2023-12-07 DIAGNOSIS — F17.200 NICOTINE DEPENDENCE, UNCOMPLICATED: Primary | ICD-10-CM

## 2023-12-07 PROCEDURE — 99999 PR PBB SHADOW E&M-EST. PATIENT-LVL I: ICD-10-PCS | Mod: PBBFAC,,,

## 2023-12-07 PROCEDURE — 99407 PR TOBACCO USE CESSATION INTENSIVE >10 MINUTES: ICD-10-PCS | Mod: S$GLB,,, | Performed by: GENERAL PRACTICE

## 2023-12-07 PROCEDURE — 99407 BEHAV CHNG SMOKING > 10 MIN: CPT | Mod: S$GLB,,, | Performed by: GENERAL PRACTICE

## 2023-12-07 PROCEDURE — 99999 PR PBB SHADOW E&M-EST. PATIENT-LVL I: CPT | Mod: PBBFAC,,,

## 2023-12-07 NOTE — PROGRESS NOTES
Spoke with patient today in regard to smoking cessation progress for 12 month follow up. He states he is tobacco free. Congratulated patient on their success to remain tobacco free. Informed patient of benefit period, future follow ups and contact information if any further help is needed. Will resolve smart form for 3/6/12 month follow up for Quit # 1.

## 2023-12-30 ENCOUNTER — PATIENT MESSAGE (OUTPATIENT)
Dept: ADMINISTRATIVE | Facility: HOSPITAL | Age: 48
End: 2023-12-30
Payer: MEDICAID

## 2024-02-05 PROBLEM — I25.10 CORONARY ARTERY DISEASE: Status: ACTIVE | Noted: 2024-02-05

## 2024-02-05 PROBLEM — T14.8XXA MUSCLE STRAIN: Status: RESOLVED | Noted: 2022-10-05 | Resolved: 2024-02-05

## 2024-02-05 PROBLEM — R45.89 DEPRESSED MOOD: Status: RESOLVED | Noted: 2022-10-05 | Resolved: 2024-02-05

## 2024-07-31 ENCOUNTER — ON-DEMAND VIRTUAL (OUTPATIENT)
Dept: URGENT CARE | Facility: CLINIC | Age: 49
End: 2024-07-31
Payer: MEDICAID

## 2024-07-31 DIAGNOSIS — J00 ACUTE RHINITIS: Primary | ICD-10-CM

## 2024-07-31 DIAGNOSIS — J02.9 PHARYNGITIS, UNSPECIFIED ETIOLOGY: ICD-10-CM

## 2024-07-31 PROCEDURE — 99212 OFFICE O/P EST SF 10 MIN: CPT | Mod: 95,,, | Performed by: NURSE PRACTITIONER

## 2024-07-31 RX ORDER — LORATADINE 10 MG/1
10 TABLET ORAL DAILY
Qty: 30 TABLET | Refills: 0 | Status: SHIPPED | OUTPATIENT
Start: 2024-07-31

## 2024-07-31 RX ORDER — FLUTICASONE PROPIONATE 50 MCG
2 SPRAY, SUSPENSION (ML) NASAL DAILY
Qty: 15.8 ML | Refills: 0 | Status: SHIPPED | OUTPATIENT
Start: 2024-07-31 | End: 2024-08-07

## 2024-07-31 NOTE — PATIENT INSTRUCTIONS
See additional patient Instructions provided    - Rest.    - Drink plenty of fluids.  - Bacterial infections can be treated with oral antibiotics, however, Viral upper respiratory infections will not respond to antibiotics but with simple symptomatic care, typically run their course in 10-14 days.     - Tylenol or Ibuprofen as directed as needed for fever/pain. Avoid tylenol if you have a history of liver disease. Do not take ibuprofen if you have a history of GI bleeding, kidney disease, or if you take blood thinners.     - You can take over-the-counter claritin, zyrtec, allegra, or xyzal as directed. These are antihistamines that can help with runny nose, nasal congestion, sneezing, and helps to dry up post-nasal drip, which usually causes sore throat and cough.   - If you do NOT have high blood pressure, you may use a decongestant form (D)  of this medication (ie. Claritin- D, zyrtec-D, allegra-D) or if you do not take the D form, you can take sudafed (pseudoephedrine) over the counter, which is a decongestant. Do NOT take two decongestant (D) medications at the same time (such as mucinex-D and claritin-D or plain sudafed and claritin D)    - You can use Flonase (fluticasone) nasal spray as directed for sinus congestion and postnasal drip. This is a steroid nasal spray that works locally over time to decrease the inflammation in your nose/sinuses and help with allergic symptoms. This is not an quick- relief spray like afrin, but it works well if used daily.  Discontinue if you develop nose bleed  - use nasal saline prior to Flonase.  - Use Ocean Spray Nasal Saline 1-3 puffs each nostril every 2-3 hours then blow out onto tissue. This is to irrigate the nasal passage way to clear the sinus openings. Use until sinus problem resolved.    - you can take plain Mucinex (guaifenesin) 1200 mg twice a day to help loosen mucous.     -warm salt water gargles can help with sore throat    - warm tea with honey can help with  cough. Honey is a natural cough suppressant.    - Dextromethorphan (DM) is a cough suppressant over the counter (ie. mucinex DM, robitussin, delsym; dayquil/nyquil has DM as well.)    - Follow up with your PCP or specialty clinic as directed in the next 1-2 weeks if not improved or as needed.  You can call [phone] to schedule an appointment with the appropriate provider.      - Go to the ER if you develop new or worsening symptoms.     - You must understand that you have received an Urgent Care treatment only and that you may be released before all of your medical problems are known or treated.   - You, the patient, will arrange for follow up care as instructed.   - If your condition worsens or fails to improve we recommend that you receive another evaluation at the ER immediately or contact your PCP to discuss your concerns or return here.     Patient Instructions   - You must understand that you have received an Urgent Care treatment only and that you may be released before all of your medical problems are known or treated.   - You, the patient, will arrange for follow up care as instructed.   - If your condition worsens or fails to improve we recommend that you receive another evaluation at the ER immediately or contact your PCP to discuss your concerns or return here.     Advised on return/follow-up precautions. Advised on ER precautions. Answered all patient questions. Patient verbalized understanding and voiced agreement with current treatment plan.

## 2024-07-31 NOTE — PROGRESS NOTES
Subjective:      Patient ID: Ricky Barroso is a 48 y.o. male.    Vitals:  vitals were not taken for this visit.     Chief Complaint: Sore Throat      Visit Type: TELE AUDIOVISUAL    Present with the patient at the time of consultation: TELEMED PRESENT WITH PATIENT: None    Past Medical History:   Diagnosis Date    Hypertension     Tobacco abuse 10/5/2022     History reviewed. No pertinent surgical history.  Review of patient's allergies indicates:  No Known Allergies  Current Outpatient Medications on File Prior to Visit   Medication Sig Dispense Refill    aspirin 81 MG Chew Take 1 tablet (81 mg total) by mouth every morning. 30 tablet 6    atorvastatin (LIPITOR) 40 MG tablet Take 1 tablet (40 mg total) by mouth every evening. 30 tablet 6    buPROPion (WELLBUTRIN SR) 150 MG TBSR 12 hr tablet Take 1 tablet (150 mg total) by mouth 2 (two) times daily. 60 tablet 0    clopidogreL (PLAVIX) 75 mg tablet Take 1 tablet (75 mg total) by mouth every morning. 30 tablet 6    LORazepam (ATIVAN) 1 MG tablet Take 1 tablet (1 mg total) by mouth once. 30 minutes before MRI scan. for 1 dose 1 tablet 0    metoprolol tartrate (LOPRESSOR) 25 MG tablet Take 0.5 tablets (12.5 mg total) by mouth 2 (two) times daily. 30 tablet 3    nicotine (NICODERM CQ) 14 mg/24 hr Place 1 patch onto the skin once daily. 28 patch 0    nicotine polacrilex 2 MG Lozg Take 1 lozenge by mouth as needed (do not exceed more than 10 a day). 72 lozenge 0    varenicline (CHANTIX) 1 mg Tab Take 1 mg by mouth 2 (two) times daily.       Current Facility-Administered Medications on File Prior to Visit   Medication Dose Route Frequency Provider Last Rate Last Admin    proparacaine 0.5 % ophthalmic solution 1 drop  1 drop Both Eyes 1 time in Clinic/HOD          No family history on file.    Medications Ordered                [facility] - [place], [place] - [address]   [address], [place] [address]    Telephone: [phone]   Fax: [phone]    Hours: Not open 24 hours                         E-Prescribed (2 of 2)              fluticasone propionate (FLONASE) 50 mcg/actuation nasal spray    Si sprays (100 mcg total) by Each Nostril route once daily. for 7 days       Start: 24     Quantity: 15.8 mL Refills: 0                         loratadine (CLARITIN) 10 mg tablet    Sig: Take 1 tablet (10 mg total) by mouth once daily.       Start: 24     Quantity: 30 tablet Refills: 0                           [facility] Peq Odvv Intake    2024 10:17 AM CDT - Filed by Patient   What is your current physical address in the event of a medical emergency? [address]   Are you able to take your vital signs? No   Please attach any relevant images or files          Patient states [place]   Sore throat x 5 days , intermittent + rhinitis   Chloraseptic throat spray, lozenges, Dayquil  denies dysphagia   Denies fever, chills, fatigue, HA,         Constitution: Negative for chills, sweating, fatigue and fever.   HENT:  Positive for postnasal drip and sore throat. Negative for ear pain, ear discharge, congestion, sinus pain, sinus pressure, trouble swallowing and voice change.    Neck: Negative for neck pain and painful lymph nodes.   Cardiovascular:  Negative for chest pain, palpitations and sob on exertion.   Respiratory:  Negative for chest tightness, cough, sputum production, shortness of breath and wheezing.    Gastrointestinal:  Negative for abdominal pain, nausea, vomiting and diarrhea.   Musculoskeletal:  Negative for muscle ache.   Skin:  Negative for color change, pale and rash.   Allergic/Immunologic: Negative for sneezing.   Neurological:  Negative for headaches.   Hematologic/Lymphatic: Negative for swollen lymph nodes.        Objective:   The physical exam was conducted virtually.  Physical Exam   Constitutional: He is oriented to person, place, and time. No distress.   HENT:   Head: Normocephalic and atraumatic.   Nose: No  rhinorrhea or congestion.   Mouth/Throat: Oropharynx is clear and moist and mucous membranes are normal.   Eyes: Conjunctivae are normal. No scleral icterus.   Pulmonary/Chest: Effort normal. No respiratory distress.   Musculoskeletal: Normal range of motion.         General: Normal range of motion.   Neurological: He is alert and oriented to person, place, and time.   Skin: Skin is not diaphoretic.   Psychiatric: His behavior is normal. Judgment and thought content normal.   Vitals reviewed.      Assessment:     1. Acute rhinitis    2. Pharyngitis, unspecified etiology        Plan:       Acute rhinitis  -     fluticasone propionate (FLONASE) 50 mcg/actuation nasal spray; 2 sprays (100 mcg total) by Each Nostril route once daily. for 7 days  Dispense: 15.8 mL; Refill: 0  -     loratadine (CLARITIN) 10 mg tablet; Take 1 tablet (10 mg total) by mouth once daily.  Dispense: 30 tablet; Refill: 0    Pharyngitis, unspecified etiology                  Patient Instructions   See additional patient Instructions provided    - Rest.    - Drink plenty of fluids.  - Bacterial infections can be treated with oral antibiotics, however, Viral upper respiratory infections will not respond to antibiotics but with simple symptomatic care, typically run their course in 10-14 days.     - Tylenol or Ibuprofen as directed as needed for fever/pain. Avoid tylenol if you have a history of liver disease. Do not take ibuprofen if you have a history of GI bleeding, kidney disease, or if you take blood thinners.     - You can take over-the-counter claritin, zyrtec, allegra, or xyzal as directed. These are antihistamines that can help with runny nose, nasal congestion, sneezing, and helps to dry up post-nasal drip, which usually causes sore throat and cough.   - If you do NOT have high blood pressure, you may use a decongestant form (D)  of this medication (ie. Claritin- D, zyrtec-D, allegra-D) or if you do not take the D form, you can take  sudafed (pseudoephedrine) over the counter, which is a decongestant. Do NOT take two decongestant (D) medications at the same time (such as mucinex-D and claritin-D or plain sudafed and claritin D)    - You can use Flonase (fluticasone) nasal spray as directed for sinus congestion and postnasal drip. This is a steroid nasal spray that works locally over time to decrease the inflammation in your nose/sinuses and help with allergic symptoms. This is not an quick- relief spray like afrin, but it works well if used daily.  Discontinue if you develop nose bleed  - use nasal saline prior to Flonase.  - Use Ocean Spray Nasal Saline 1-3 puffs each nostril every 2-3 hours then blow out onto tissue. This is to irrigate the nasal passage way to clear the sinus openings. Use until sinus problem resolved.    - you can take plain Mucinex (guaifenesin) 1200 mg twice a day to help loosen mucous.     -warm salt water gargles can help with sore throat    - warm tea with honey can help with cough. Honey is a natural cough suppressant.    - Dextromethorphan (DM) is a cough suppressant over the counter (ie. mucinex DM, robitussin, delsym; dayquil/nyquil has DM as well.)    - Follow up with your PCP or specialty clinic as directed in the next 1-2 weeks if not improved or as needed.  You can call [phone] to schedule an appointment with the appropriate provider.      - Go to the ER if you develop new or worsening symptoms.     - You must understand that you have received an Urgent Care treatment only and that you may be released before all of your medical problems are known or treated.   - You, the patient, will arrange for follow up care as instructed.   - If your condition worsens or fails to improve we recommend that you receive another evaluation at the ER immediately or contact your PCP to discuss your concerns or return here.     Patient Instructions   - You must understand that you have received an Urgent Care treatment only  and that you may be released before all of your medical problems are known or treated.   - You, the patient, will arrange for follow up care as instructed.   - If your condition worsens or fails to improve we recommend that you receive another evaluation at the ER immediately or contact your PCP to discuss your concerns or return here.     Advised on return/follow-up precautions. Advised on ER precautions. Answered all patient questions. Patient verbalized understanding and voiced agreement with current treatment plan.

## 2024-08-08 PROBLEM — R73.03 PREDIABETES: Status: ACTIVE | Noted: 2024-08-08

## 2025-01-22 PROBLEM — F17.200 SMOKER: Status: ACTIVE | Noted: 2022-10-05

## 2025-01-22 PROBLEM — F41.1 GAD (GENERALIZED ANXIETY DISORDER): Status: ACTIVE | Noted: 2025-01-22

## 2025-01-23 ENCOUNTER — PATIENT MESSAGE (OUTPATIENT)
Dept: ADMINISTRATIVE | Facility: HOSPITAL | Age: 50
End: 2025-01-23
Payer: MEDICAID